# Patient Record
Sex: MALE | URBAN - METROPOLITAN AREA
[De-identification: names, ages, dates, MRNs, and addresses within clinical notes are randomized per-mention and may not be internally consistent; named-entity substitution may affect disease eponyms.]

---

## 2017-03-29 ENCOUNTER — IMPORTED ENCOUNTER (OUTPATIENT)
Dept: URBAN - METROPOLITAN AREA CLINIC 31 | Facility: CLINIC | Age: 82
End: 2017-03-29

## 2017-03-29 PROBLEM — H17.12: Noted: 2017-03-29

## 2017-03-29 PROBLEM — H04.123: Noted: 2017-03-29

## 2017-03-29 PROBLEM — Z96.1: Noted: 2017-03-29

## 2017-03-29 PROCEDURE — 99213 OFFICE O/P EST LOW 20 MIN: CPT

## 2017-03-29 NOTE — PATIENT DISCUSSION
1.  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 Dry Eye OU:  Continue current management with Artificial Tears. 2-4x/d ok to use gel drops during day3. Pseudophakia OU - IOLs stable. Monitor. 4. Return for an appointment in 6 months for dilated fundus exam. with Dr. David Tavera.

## 2017-10-25 ENCOUNTER — IMPORTED ENCOUNTER (OUTPATIENT)
Dept: URBAN - METROPOLITAN AREA CLINIC 31 | Facility: CLINIC | Age: 82
End: 2017-10-25

## 2017-10-25 PROBLEM — H04.123: Noted: 2017-10-25

## 2017-10-25 PROBLEM — H17.12: Noted: 2017-10-25

## 2017-10-25 PROBLEM — Z96.1: Noted: 2017-10-25

## 2017-10-25 PROCEDURE — 92014 COMPRE OPH EXAM EST PT 1/>: CPT

## 2017-10-25 NOTE — PATIENT DISCUSSION
1.  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 Dry Eye OU:  Continue current management with Artificial Tears. 2-4x/d ok to use gel drops during day3. Pseudophakia OU - IOLs stable. Monitor. 4. DementiaReturn for an appointment in 9 months for office call. with Dr. Kanu Walsh.

## 2022-07-30 ENCOUNTER — TELEPHONE ENCOUNTER (OUTPATIENT)
Age: 87
End: 2022-07-30

## 2022-07-31 ENCOUNTER — TELEPHONE ENCOUNTER (OUTPATIENT)
Age: 87
End: 2022-07-31